# Patient Record
Sex: FEMALE | Race: WHITE | Employment: FULL TIME | URBAN - METROPOLITAN AREA
[De-identification: names, ages, dates, MRNs, and addresses within clinical notes are randomized per-mention and may not be internally consistent; named-entity substitution may affect disease eponyms.]

---

## 2024-08-12 ENCOUNTER — OFFICE VISIT (OUTPATIENT)
Dept: UROLOGY | Age: 69
End: 2024-08-12
Payer: COMMERCIAL

## 2024-08-12 ENCOUNTER — TELEPHONE (OUTPATIENT)
Dept: UROLOGY | Age: 69
End: 2024-08-12

## 2024-08-12 DIAGNOSIS — N20.0 CALCULUS OF KIDNEY: Primary | ICD-10-CM

## 2024-08-12 DIAGNOSIS — N20.0 KIDNEY STONE: ICD-10-CM

## 2024-08-12 LAB
BILIRUBIN, URINE, POC: NEGATIVE
BLOOD URINE, POC: ABNORMAL
GLUCOSE URINE, POC: NEGATIVE
KETONES, URINE, POC: NEGATIVE
LEUKOCYTE ESTERASE, URINE, POC: ABNORMAL
NITRITE, URINE, POC: NEGATIVE
PH, URINE, POC: 5.5 (ref 4.6–8)
PROTEIN,URINE, POC: NEGATIVE
SPECIFIC GRAVITY, URINE, POC: 1.02 (ref 1–1.03)
URINALYSIS CLARITY, POC: CLEAR
URINALYSIS COLOR, POC: YELLOW
UROBILINOGEN, POC: ABNORMAL

## 2024-08-12 PROCEDURE — 1123F ACP DISCUSS/DSCN MKR DOCD: CPT | Performed by: NURSE PRACTITIONER

## 2024-08-12 PROCEDURE — 81003 URINALYSIS AUTO W/O SCOPE: CPT | Performed by: NURSE PRACTITIONER

## 2024-08-12 PROCEDURE — 99204 OFFICE O/P NEW MOD 45 MIN: CPT | Performed by: NURSE PRACTITIONER

## 2024-08-12 RX ORDER — TRAMADOL HYDROCHLORIDE 50 MG/1
50 TABLET ORAL EVERY 6 HOURS PRN
COMMUNITY

## 2024-08-12 ASSESSMENT — ENCOUNTER SYMPTOMS
BACK PAIN: 0
HEARTBURN: 0
DIARRHEA: 0
ABDOMINAL PAIN: 0
EYE PAIN: 0
NAUSEA: 1
WHEEZING: 0
VOMITING: 0
SKIN LESIONS: 0
SHORTNESS OF BREATH: 0
CONSTIPATION: 0
EYE DISCHARGE: 0
COUGH: 0
INDIGESTION: 0
BLOOD IN STOOL: 0

## 2024-08-12 NOTE — TELEPHONE ENCOUNTER
Patient needs to be scheduled for cystoscopy, left retrograde, left ureteroscopy holmium laser and possible left stent placement    Orders have been done    Thank you  Ally

## 2024-08-12 NOTE — PROGRESS NOTES
[facility]  [address] [place] [address]  [phone]          Shruthi Ahuja  : 1955    Chief Complaint   Patient presents with    New Patient    Nephrolithiasis          HPI     Shruthi Ahuja is a 69 y.o. female  Patient here today for follow-up after being seen at emergency MD secondary to abrupt onset left flank pain.  The pain started last Monday midday.  She had had stones before and knew that this may be a stone trying to pass.  She has said she is had only 1 other stone as she was 20 years old at that time.  The stone measured 15 mm.    Since last Monday she has had intermittent pain on and off.  Today the pain has increased somewhat.  It is still located high on the left flank area.  It rarely radiates.  She has had some mild nausea but no vomiting.  She has not experienced any fever chills or gross hematuria.    CT was obtained at emergency MD.  I have the report only and that can be seen under the media section of the chart.  Impression of CT revealed bilateral nephrolithiasis with a 7 mm left ureteral pelvic junction stone with moderate hydronephrosis.  She is controlling her pain with tramadol.  She does not need refills of that today.    We will obtain a KUB today.    Past Medical History:   Diagnosis Date    History of kidney stones      Past Surgical History:   Procedure Laterality Date    CARPAL TUNNEL RELEASE       SECTION  1983    Pt reports 2 more in 1985 and 1987    CHOLECYSTECTOMY  2021    LITHOTRIPSY  2021     Current Outpatient Medications   Medication Sig Dispense Refill    traMADol (ULTRAM) 50 MG tablet Take 1 tablet by mouth every 6 hours as needed for Pain. Max Daily Amount: 200 mg       No current facility-administered medications for this visit.     Allergies   Allergen Reactions    Codone [Hydrocodone]      Social History     Socioeconomic History    Marital status:      Spouse name: Not on file    Number of children:

## 2024-08-15 PROBLEM — N20.0 KIDNEY STONE: Status: ACTIVE | Noted: 2024-08-12

## 2024-08-15 RX ORDER — AMOXICILLIN 500 MG
1 CAPSULE ORAL DAILY
COMMUNITY

## 2024-08-15 RX ORDER — AMPICILLIN TRIHYDRATE 250 MG
2 CAPSULE ORAL DAILY
COMMUNITY

## 2024-08-15 NOTE — TELEPHONE ENCOUNTER
Procedures: Procedure(s):   CYSTOSCOPY, LEFT RETROGRADE, LEFT URETEROSCOPY, HOLMIUM LASER, POSSIBLE LEFT STENT PLACEMENT   Date: 8/20/2024   Time: 1310   Location: [facility] MAIN OR 01 [facility]STO     Scheduled.  Post op appt scheduled.

## 2024-08-19 ENCOUNTER — ANESTHESIA EVENT (OUTPATIENT)
Dept: SURGERY | Age: 69
End: 2024-08-19
Payer: COMMERCIAL

## 2024-08-20 ENCOUNTER — HOSPITAL ENCOUNTER (OUTPATIENT)
Age: 69
Setting detail: OUTPATIENT SURGERY
Discharge: HOME | End: 2024-08-20
Attending: UROLOGY | Admitting: UROLOGY
Payer: COMMERCIAL

## 2024-08-20 ENCOUNTER — ANESTHESIA (OUTPATIENT)
Dept: SURGERY | Age: 69
End: 2024-08-20
Payer: COMMERCIAL

## 2024-08-20 VITALS
HEART RATE: 70 BPM | RESPIRATION RATE: 17 BRPM | SYSTOLIC BLOOD PRESSURE: 148 MMHG | HEIGHT: 62 IN | OXYGEN SATURATION: 97 % | WEIGHT: 220.6 LBS | BODY MASS INDEX: 40.59 KG/M2 | TEMPERATURE: 98 F | DIASTOLIC BLOOD PRESSURE: 72 MMHG

## 2024-08-20 DIAGNOSIS — N20.0 KIDNEY STONE: Primary | ICD-10-CM

## 2024-08-20 LAB
ANION GAP SERPL CALC-SCNC: 11 MMOL/L (ref 9–18)
BUN SERPL-MCNC: 13 MG/DL (ref 8–23)
CALCIUM SERPL-MCNC: 9.2 MG/DL (ref 8.8–10.2)
CHLORIDE SERPL-SCNC: 108 MMOL/L (ref 98–107)
CO2 SERPL-SCNC: 22 MMOL/L (ref 20–28)
CREAT SERPL-MCNC: 0.78 MG/DL (ref 0.6–1.1)
ERYTHROCYTE [DISTWIDTH] IN BLOOD BY AUTOMATED COUNT: 11.9 % (ref 11.9–14.6)
GLUCOSE SERPL-MCNC: 116 MG/DL (ref 70–99)
HCT VFR BLD AUTO: 43.5 % (ref 35.8–46.3)
HGB BLD-MCNC: 14.8 G/DL (ref 11.7–15.4)
MCH RBC QN AUTO: 33.4 PG (ref 26.1–32.9)
MCHC RBC AUTO-ENTMCNC: 34 G/DL (ref 31.4–35)
MCV RBC AUTO: 98.2 FL (ref 82–102)
NRBC # BLD: 0 K/UL (ref 0–0.2)
PLATELET # BLD AUTO: 306 K/UL (ref 150–450)
PMV BLD AUTO: 9.3 FL (ref 9.4–12.3)
POTASSIUM SERPL-SCNC: 4.4 MMOL/L (ref 3.5–5.1)
RBC # BLD AUTO: 4.43 M/UL (ref 4.05–5.2)
SODIUM SERPL-SCNC: 141 MMOL/L (ref 136–145)
WBC # BLD AUTO: 7.4 K/UL (ref 4.3–11.1)

## 2024-08-20 PROCEDURE — 6360000002 HC RX W HCPCS: Performed by: NURSE ANESTHETIST, CERTIFIED REGISTERED

## 2024-08-20 PROCEDURE — C1894 INTRO/SHEATH, NON-LASER: HCPCS | Performed by: UROLOGY

## 2024-08-20 PROCEDURE — 6370000000 HC RX 637 (ALT 250 FOR IP): Performed by: ANESTHESIOLOGY

## 2024-08-20 PROCEDURE — C1769 GUIDE WIRE: HCPCS | Performed by: UROLOGY

## 2024-08-20 PROCEDURE — 82355 CALCULUS ANALYSIS QUAL: CPT

## 2024-08-20 PROCEDURE — 2720000010 HC SURG SUPPLY STERILE: Performed by: UROLOGY

## 2024-08-20 PROCEDURE — 3700000000 HC ANESTHESIA ATTENDED CARE: Performed by: UROLOGY

## 2024-08-20 PROCEDURE — 3600000004 HC SURGERY LEVEL 4 BASE: Performed by: UROLOGY

## 2024-08-20 PROCEDURE — 7100000001 HC PACU RECOVERY - ADDTL 15 MIN: Performed by: UROLOGY

## 2024-08-20 PROCEDURE — 85027 COMPLETE CBC AUTOMATED: CPT

## 2024-08-20 PROCEDURE — 88300 SURGICAL PATH GROSS: CPT

## 2024-08-20 PROCEDURE — 6360000004 HC RX CONTRAST MEDICATION: Performed by: UROLOGY

## 2024-08-20 PROCEDURE — C1747 HC ENDOSCOPE, SINGLE, URINARY TRACT: HCPCS | Performed by: UROLOGY

## 2024-08-20 PROCEDURE — 80048 BASIC METABOLIC PNL TOTAL CA: CPT

## 2024-08-20 PROCEDURE — 7100000000 HC PACU RECOVERY - FIRST 15 MIN: Performed by: UROLOGY

## 2024-08-20 PROCEDURE — 2709999900 HC NON-CHARGEABLE SUPPLY: Performed by: UROLOGY

## 2024-08-20 PROCEDURE — 2500000003 HC RX 250 WO HCPCS: Performed by: NURSE ANESTHETIST, CERTIFIED REGISTERED

## 2024-08-20 PROCEDURE — 3600000014 HC SURGERY LEVEL 4 ADDTL 15MIN: Performed by: UROLOGY

## 2024-08-20 PROCEDURE — 6360000002 HC RX W HCPCS: Performed by: NURSE PRACTITIONER

## 2024-08-20 PROCEDURE — C2617 STENT, NON-COR, TEM W/O DEL: HCPCS | Performed by: UROLOGY

## 2024-08-20 PROCEDURE — 7100000010 HC PHASE II RECOVERY - FIRST 15 MIN: Performed by: UROLOGY

## 2024-08-20 PROCEDURE — 3700000001 HC ADD 15 MINUTES (ANESTHESIA): Performed by: UROLOGY

## 2024-08-20 PROCEDURE — 74420 UROGRAPHY RTRGR +-KUB: CPT | Performed by: UROLOGY

## 2024-08-20 PROCEDURE — 52356 CYSTO/URETERO W/LITHOTRIPSY: CPT | Performed by: UROLOGY

## 2024-08-20 PROCEDURE — 7100000011 HC PHASE II RECOVERY - ADDTL 15 MIN: Performed by: UROLOGY

## 2024-08-20 PROCEDURE — 2580000003 HC RX 258: Performed by: ANESTHESIOLOGY

## 2024-08-20 DEVICE — URETERAL STENT
Type: IMPLANTABLE DEVICE | Site: URETER | Status: FUNCTIONAL
Brand: PERCUFLEX™ PLUS

## 2024-08-20 RX ORDER — SODIUM CHLORIDE, SODIUM LACTATE, POTASSIUM CHLORIDE, CALCIUM CHLORIDE 600; 310; 30; 20 MG/100ML; MG/100ML; MG/100ML; MG/100ML
INJECTION, SOLUTION INTRAVENOUS CONTINUOUS
Status: DISCONTINUED | OUTPATIENT
Start: 2024-08-20 | End: 2024-08-20 | Stop reason: HOSPADM

## 2024-08-20 RX ORDER — OXYCODONE HYDROCHLORIDE AND ACETAMINOPHEN 5; 325 MG/1; MG/1
1 TABLET ORAL EVERY 6 HOURS PRN
Qty: 20 TABLET | Refills: 0 | Status: SHIPPED | OUTPATIENT
Start: 2024-08-20 | End: 2024-08-25

## 2024-08-20 RX ORDER — SODIUM CHLORIDE 9 MG/ML
INJECTION, SOLUTION INTRAVENOUS PRN
Status: DISCONTINUED | OUTPATIENT
Start: 2024-08-20 | End: 2024-08-20 | Stop reason: HOSPADM

## 2024-08-20 RX ORDER — ROCURONIUM BROMIDE 10 MG/ML
INJECTION, SOLUTION INTRAVENOUS PRN
Status: DISCONTINUED | OUTPATIENT
Start: 2024-08-20 | End: 2024-08-20 | Stop reason: SDUPTHER

## 2024-08-20 RX ORDER — ONDANSETRON 2 MG/ML
INJECTION INTRAMUSCULAR; INTRAVENOUS PRN
Status: DISCONTINUED | OUTPATIENT
Start: 2024-08-20 | End: 2024-08-20 | Stop reason: SDUPTHER

## 2024-08-20 RX ORDER — FENTANYL CITRATE 50 UG/ML
INJECTION, SOLUTION INTRAMUSCULAR; INTRAVENOUS PRN
Status: DISCONTINUED | OUTPATIENT
Start: 2024-08-20 | End: 2024-08-20 | Stop reason: SDUPTHER

## 2024-08-20 RX ORDER — NEOSTIGMINE METHYLSULFATE 1 MG/ML
INJECTION INTRAVENOUS PRN
Status: DISCONTINUED | OUTPATIENT
Start: 2024-08-20 | End: 2024-08-20 | Stop reason: SDUPTHER

## 2024-08-20 RX ORDER — SODIUM CHLORIDE 0.9 % (FLUSH) 0.9 %
5-40 SYRINGE (ML) INJECTION EVERY 12 HOURS SCHEDULED
Status: DISCONTINUED | OUTPATIENT
Start: 2024-08-20 | End: 2024-08-20 | Stop reason: HOSPADM

## 2024-08-20 RX ORDER — NALOXONE HYDROCHLORIDE 0.4 MG/ML
INJECTION, SOLUTION INTRAMUSCULAR; INTRAVENOUS; SUBCUTANEOUS PRN
Status: DISCONTINUED | OUTPATIENT
Start: 2024-08-20 | End: 2024-08-20 | Stop reason: HOSPADM

## 2024-08-20 RX ORDER — SODIUM CHLORIDE 0.9 % (FLUSH) 0.9 %
5-40 SYRINGE (ML) INJECTION PRN
Status: DISCONTINUED | OUTPATIENT
Start: 2024-08-20 | End: 2024-08-20 | Stop reason: HOSPADM

## 2024-08-20 RX ORDER — LIDOCAINE HYDROCHLORIDE 10 MG/ML
1 INJECTION, SOLUTION INFILTRATION; PERINEURAL
Status: DISCONTINUED | OUTPATIENT
Start: 2024-08-20 | End: 2024-08-20 | Stop reason: HOSPADM

## 2024-08-20 RX ORDER — PROCHLORPERAZINE EDISYLATE 5 MG/ML
5 INJECTION INTRAMUSCULAR; INTRAVENOUS
Status: DISCONTINUED | OUTPATIENT
Start: 2024-08-20 | End: 2024-08-20 | Stop reason: HOSPADM

## 2024-08-20 RX ORDER — OXYCODONE HYDROCHLORIDE 5 MG/1
5 TABLET ORAL PRN
Status: DISCONTINUED | OUTPATIENT
Start: 2024-08-20 | End: 2024-08-20 | Stop reason: HOSPADM

## 2024-08-20 RX ORDER — ACETAMINOPHEN 500 MG
1000 TABLET ORAL ONCE
Status: COMPLETED | OUTPATIENT
Start: 2024-08-20 | End: 2024-08-20

## 2024-08-20 RX ORDER — LABETALOL HYDROCHLORIDE 5 MG/ML
INJECTION, SOLUTION INTRAVENOUS PRN
Status: DISCONTINUED | OUTPATIENT
Start: 2024-08-20 | End: 2024-08-20 | Stop reason: SDUPTHER

## 2024-08-20 RX ORDER — ESMOLOL HYDROCHLORIDE 10 MG/ML
INJECTION, SOLUTION INTRAVENOUS PRN
Status: DISCONTINUED | OUTPATIENT
Start: 2024-08-20 | End: 2024-08-20 | Stop reason: SDUPTHER

## 2024-08-20 RX ORDER — ONDANSETRON 2 MG/ML
4 INJECTION INTRAMUSCULAR; INTRAVENOUS
Status: DISCONTINUED | OUTPATIENT
Start: 2024-08-20 | End: 2024-08-20 | Stop reason: HOSPADM

## 2024-08-20 RX ORDER — OXYCODONE HYDROCHLORIDE 5 MG/1
10 TABLET ORAL PRN
Status: DISCONTINUED | OUTPATIENT
Start: 2024-08-20 | End: 2024-08-20 | Stop reason: HOSPADM

## 2024-08-20 RX ORDER — HYDRALAZINE HYDROCHLORIDE 20 MG/ML
5 INJECTION INTRAMUSCULAR; INTRAVENOUS ONCE
Status: DISCONTINUED | OUTPATIENT
Start: 2024-08-20 | End: 2024-08-20 | Stop reason: HOSPADM

## 2024-08-20 RX ORDER — DIPHENHYDRAMINE HYDROCHLORIDE 50 MG/ML
12.5 INJECTION INTRAMUSCULAR; INTRAVENOUS
Status: DISCONTINUED | OUTPATIENT
Start: 2024-08-20 | End: 2024-08-20 | Stop reason: HOSPADM

## 2024-08-20 RX ORDER — DEXAMETHASONE SODIUM PHOSPHATE 4 MG/ML
INJECTION, SOLUTION INTRA-ARTICULAR; INTRALESIONAL; INTRAMUSCULAR; INTRAVENOUS; SOFT TISSUE PRN
Status: DISCONTINUED | OUTPATIENT
Start: 2024-08-20 | End: 2024-08-20 | Stop reason: SDUPTHER

## 2024-08-20 RX ORDER — CEPHALEXIN 500 MG/1
500 CAPSULE ORAL 2 TIMES DAILY
Qty: 6 CAPSULE | Refills: 0 | Status: SHIPPED | OUTPATIENT
Start: 2024-08-20 | End: 2024-08-23

## 2024-08-20 RX ORDER — PROPOFOL 10 MG/ML
INJECTION, EMULSION INTRAVENOUS PRN
Status: DISCONTINUED | OUTPATIENT
Start: 2024-08-20 | End: 2024-08-20 | Stop reason: SDUPTHER

## 2024-08-20 RX ORDER — MIDAZOLAM HYDROCHLORIDE 2 MG/2ML
2 INJECTION, SOLUTION INTRAMUSCULAR; INTRAVENOUS
Status: DISCONTINUED | OUTPATIENT
Start: 2024-08-20 | End: 2024-08-20 | Stop reason: HOSPADM

## 2024-08-20 RX ORDER — GLYCOPYRROLATE 0.2 MG/ML
INJECTION INTRAMUSCULAR; INTRAVENOUS PRN
Status: DISCONTINUED | OUTPATIENT
Start: 2024-08-20 | End: 2024-08-20 | Stop reason: SDUPTHER

## 2024-08-20 RX ADMIN — ONDANSETRON 4 MG: 2 INJECTION INTRAMUSCULAR; INTRAVENOUS at 12:57

## 2024-08-20 RX ADMIN — ESMOLOL HYDROCHLORIDE 30 MG: 10 INJECTION INTRAVENOUS at 12:52

## 2024-08-20 RX ADMIN — PROPOFOL 200 MG: 10 INJECTION, EMULSION INTRAVENOUS at 12:45

## 2024-08-20 RX ADMIN — Medication 2 G: at 12:55

## 2024-08-20 RX ADMIN — DEXAMETHASONE SODIUM PHOSPHATE 4 MG: 4 INJECTION INTRA-ARTICULAR; INTRALESIONAL; INTRAMUSCULAR; INTRAVENOUS; SOFT TISSUE at 12:57

## 2024-08-20 RX ADMIN — LABETALOL HYDROCHLORIDE 10 MG: 5 INJECTION INTRAVENOUS at 13:33

## 2024-08-20 RX ADMIN — FENTANYL CITRATE 50 MCG: 50 INJECTION, SOLUTION INTRAMUSCULAR; INTRAVENOUS at 12:45

## 2024-08-20 RX ADMIN — NEOSTIGMINE METHYLSULFATE 5 MG: 1 INJECTION, SOLUTION INTRAVENOUS at 13:27

## 2024-08-20 RX ADMIN — ACETAMINOPHEN 1000 MG: 500 TABLET, FILM COATED ORAL at 11:29

## 2024-08-20 RX ADMIN — FENTANYL CITRATE 50 MCG: 50 INJECTION, SOLUTION INTRAMUSCULAR; INTRAVENOUS at 12:52

## 2024-08-20 RX ADMIN — GLYCOPYRROLATE 0.4 MG: 0.2 INJECTION INTRAMUSCULAR; INTRAVENOUS at 13:27

## 2024-08-20 RX ADMIN — ROCURONIUM BROMIDE 35 MG: 10 INJECTION, SOLUTION INTRAVENOUS at 12:45

## 2024-08-20 RX ADMIN — SODIUM CHLORIDE, POTASSIUM CHLORIDE, SODIUM LACTATE AND CALCIUM CHLORIDE: 600; 310; 30; 20 INJECTION, SOLUTION INTRAVENOUS at 11:29

## 2024-08-20 ASSESSMENT — PAIN SCALES - GENERAL
PAINLEVEL_OUTOF10: 0

## 2024-08-20 ASSESSMENT — PAIN - FUNCTIONAL ASSESSMENT: PAIN_FUNCTIONAL_ASSESSMENT: 0-10

## 2024-08-20 ASSESSMENT — LIFESTYLE VARIABLES: SMOKING_STATUS: 0

## 2024-08-20 NOTE — OP NOTE
usual sterile fashion.  I began the case by inserting a 22-Wallisian rigid cystoscope with a 30-degree lens in the urethra, advanced in the patient's bladder.  Pan cystoscopy was performed, which was unremarkable.  The left ureteral orifice was cannulated with a Sensor wire, which was advanced under fluoroscopic guidance in the left renal pelvis.  Once this was in position, I back-loaded the scope off the wire, switched to a pressure bag and my rigid ureteroscope.  I inserted this under direct vision using a 2nd Sensor wire as a guide into the left ureter.  Once in the left ureter, I injected 10 mL of contrast through the scope to perform the left retrograde pyelogram, which showed a filling defect in the left proximal UPJ consistent with a stone.  There was hydronephrosis behind the level of the stone.  I then advanced my scope further up the ureter to where the stone and obstruction was.  The rigid ureteroscope was able to reach it.  I then got a two 42 holmium laser fiber and I used the settings of 0.8 joules and 8 hertz to laser the stone into small pieces.  Some of the pieces were remained in the ureter, but some floated back into the left renal pelvis.  After the lasering was complete, I used the DrFirst basket to grab the pieces in the ureter and bring them down to the bladder and deposit them there.  I then inspected the entire course of the ureter from the UPJ to the UVJ with the rigid scope and no concerning ureter stones and no evidence of ureteral injury was found.  I then replaced the Sensor wire through the rigid scope alongside the 1st into the left renal pelvis.  I removed the rigid ureteroscope and then switched to my 11 x 13 ureteral access sheath.  I passed this under fluoroscopic guidance into the proximal left ureter.  Once in position, I removed the inner sheath, leaving the outer sheath in place with the safety wire.  I then switched to my flexible ureteroscope and inserted this through the access

## 2024-08-20 NOTE — BRIEF OP NOTE
Brief Postoperative Note      Patient: Shruthi Ahuja  YOB: 1955  MRN: [id number]    Date of Procedure: 8/20/2024    Pre-Op Diagnosis Codes:      * Kidney stone [N20.0]    Post-Op Diagnosis: Same       Procedure(s):  CYSTOSCOPY, LEFT RETROGRADE, LEFT URETEROSCOPY, HOLMIUM LASER, LEFT STENT PLACEMENT, Basket Stone Extraction    Surgeon(s):  Flako Mir MD    Assistant:  * No surgical staff found *    Anesthesia: General    Estimated Blood Loss (mL): Minimal    Complications: None    Specimens:   ID Type Source Tests Collected by Time Destination   A : Left Kidney Stones Tissue Kidney SURGICAL PATHOLOGY Flako Mir MD 8/20/2024 1319        Implants:  Implant Name Type Inv. Item Serial No.  Lot No. LRB No. Used Action   STENT URET 6FR L24CM HYDR+ GRAD CIRCUMFERENTIAL MRK LO PROF - [id number]  STENT URET 6FR L24CM HYDR+ GRAD CIRCUMFERENTIAL MRK LO PROF  [employer] [facility]-WD [id number] Left 1 Implanted         Drains: * No LDAs found *    Findings:  Infection Present At Time Of Surgery (PATOS) (choose all levels that have infection present):  No infection present  Other Findings: 9 mm L UPJ stone fragmented / removed.      Electronically signed by Flako Mir MD on 8/20/2024 at 2:00 PM

## 2024-08-20 NOTE — PERIOP NOTE
Called DR. Mir's office to confirm location of follow up visit on 8/27 as 2 various locations were listed. Location confirmed for [place]. Pt/family updated and understanding verbalized.  
Dr. Warren called and updatedon pt's BP trend  and that hydralazine was not given. verbal sign-out obtained at this time.   
MD Childers called and updated on pt bp 187/86. HR 79. Orders received. Will medicate and continue to monitor.   
permitted.

## 2024-08-20 NOTE — ANESTHESIA PROCEDURE NOTES
Airway  Date/Time: 8/20/2024 12:48 PM  Urgency: elective    Airway not difficult    General Information and Staff    Patient location during procedure: OR  Anesthesiologist: Armando Childers MD  Resident/CRNA: Joanne Barrientos APRN - CRNA  Performed: resident/CRNA/CAA   Performed by: Joanne Barrientos APRN - CRNA  Authorized by: Armando Childers MD      Indications and Patient Condition  Indications for airway management: anesthesia  Spontaneous ventilation: present  Sedation level: deep  Preoxygenated: yes  MILS not maintained throughout  Mask difficulty assessment: vent by bag mask    Final Airway Details  Final airway type: endotracheal airway      Successful airway: ETT  Cuffed: yes   Successful intubation technique: direct laryngoscopy  Facilitating devices/methods: intubating stylet  Endotracheal tube insertion site: oral  Blade: Iban  Blade size: #4  ETT size (mm): 7.0  Cormack-Lehane Classification: grade I - full view of glottis  Placement verified by: chest auscultation and capnometry   Inital cuff pressure (cm H2O): 8  Measured from: lips  ETT to teeth (cm): 22  ETT to lips (cm): 22  Number of attempts at approach: 1  Ventilation between attempts: bag mask  Number of other approaches attempted: 0    no

## 2024-08-20 NOTE — ANESTHESIA PRE PROCEDURE
Reactions    Codone [Hydrocodone]        Problem List:    Patient Active Problem List   Diagnosis Code    Kidney stone N20.0       Past Medical History:        Diagnosis Date    COVID-2020    History of kidney stones        Past Surgical History:        Procedure Laterality Date    CARPAL TUNNEL RELEASE       SECTION  1983    Pt reports 2 more in 1985 and 1987    CHOLECYSTECTOMY  2021    COLONOSCOPY  2024    multiple procedures in past    LITHOTRIPSY  2021       Social History:    Social History     Tobacco Use    Smoking status: Never     Passive exposure: Never    Smokeless tobacco: Never   Substance Use Topics    Alcohol use: Never                                Counseling given: Not Answered      Vital Signs (Current):   Vitals:    08/15/24 1323 24 1104 24 1106   BP:  (!) 168    Pulse:  80 80   Resp:  16    Temp:  98.2 °F (36.8 °C)    TempSrc:  Oral    SpO2:  98%    Weight: 99.8 kg (220 lb) 100.1 kg (220 lb 9.6 oz)    Height: 1.575 m (5' 2\") 1.575 m (5' 2\")                                               BP Readings from Last 3 Encounters:   24 (!) 168/77       NPO Status: Time of last liquid consumption:                         Time of last solid consumption:                         Date of last liquid consumption: 24                        Date of last solid food consumption: 24    BMI:   Wt Readings from Last 3 Encounters:   24 100.1 kg (220 lb 9.6 oz)     Body mass index is 40.35 kg/m².    CBC:   Lab Results   Component Value Date/Time    WBC 7.4 2024 11:10 AM    RBC 4.43 2024 11:10 AM    HGB 14.8 2024 11:10 AM    HCT 43.5 2024 11:10 AM    MCV 98.2 2024 11:10 AM    RDW 11.9 2024 11:10 AM     2024 11:10 AM       CMP: No results found for: \"NA\", \"K\", \"CL\", \"CO2\", \"BUN\", \"CREATININE\", \"GFRAA\", \"AGRATIO\", \"LABGLOM\", \"GLUCOSE\", \"GLU\", \"CALCIUM\", \"BILITOT\", \"ALKPHOS\", \"AST\", \"ALT\"    POC

## 2024-08-20 NOTE — DISCHARGE INSTRUCTIONS
maintaining a healthy lifestyle    You may be retaining fluid if you have a history of heart failure or if you experience any of the following symptoms:  Weight gain of 3 pounds or more overnight or 5 pounds in a week, increased swelling in our hands or feet or shortness of breath while lying flat in bed.  Please call your doctor as soon as you notice any of these symptoms; do not wait until your next office visit.

## 2024-08-20 NOTE — H&P
AUTO W/O MICRO   Result Value Ref Range     Color (UA POC) Yellow       Clarity (UA POC) Clear       Glucose, Urine, POC Negative       Bilirubin, Urine, POC Negative       KETONES, Urine, POC Negative       Specific Gravity, Urine, POC 1.025 (A) 1.001 - 1.035     Blood (UA POC) Trace-lysed       pH, Urine, POC 5.5 4.6 - 8.0     Protein, Urine, POC Negative       Urobilinogen, POC 0.2 mg/dL       Nitrite, Urine, POC Negative       Leukocyte Esterase, Urine, POC Small        PHYSICAL EXAM        GENERAL: No acute distress, Awake, Alert, Oriented X 3, Gait normal  CHEST AND LUNG: Easy work of breathing, clear to auscultation bilaterally, no cyanosis  CARDIAC: regular rate and rhythm  ABDOMEN: soft, non tender, mild left CVA tenderness  SKIN: No rash, no erythema, no lacerations or abrasions, no ecchymosis  MUSCULOSKELETAL- normal gait and station.         Assessment and Plan      ICD-10-CM     1. Calculus of kidney  N20.0 AMB POC URINALYSIS DIP STICK AUTO W/O MICRO       XR ABDOMEN (KUB) (SINGLE AP VIEW)          Kidney stone-  KUB today no stone is seen.  CT had revealed a 7 mm left ureteropelvic junction stone.  Patient would like to have this removed if at all possible.  Ureteroscopy will need to be arranged.     Treatment options with risks of benefits were discussed with the patient in detail.  Treatment options discussed include watchful waiting in hopes that the stone will pass, ESWL, ureteroscopy with laser lithotripsy. Plan is for cystoscopy, left ureteroscopy, laser lithotripsy, and left stent placement. Risk include but are not limited to bleeding, infection, ureteral injury requiring repair, risk or anesthesia. The  Patient understands that if we are unable to remove the stone, we will place a stent and the stone will be treated at a later date. The patient understands that the stent is temporary and must be removed in the office              Orders Placed This Encounter    XR ABDOMEN (KUB) (SINGLE AP

## 2024-08-21 NOTE — ANESTHESIA POSTPROCEDURE EVALUATION
Department of Anesthesiology  Postprocedure Note    Patient: Shruthi Ahuja  MRN: [id number]  YOB: 1955  Date of evaluation: 8/20/2024    Procedure Summary       Date: 08/20/24 Room / Location: [facility] MAIN OR  [facility] MAIN OR    Anesthesia Start: 1233 Anesthesia Stop: 1341    Procedure: CYSTOSCOPY, LEFT RETROGRADE, LEFT URETEROSCOPY, HOLMIUM LASER, LEFT STENT PLACEMENT (Left: Ureter) Diagnosis:       Kidney stone      (Kidney stone [N20.0])    Providers: Flako Mir MD Responsible Provider: Armando Childers MD    Anesthesia Type: General ASA Status: 2            Anesthesia Type: General    Xavier Phase I: Xavier Score: 9    Xavier Phase II: Xavier Score: 10    Anesthesia Post Evaluation    Patient location during evaluation: PACU  Patient participation: complete - patient participated  Level of consciousness: awake and alert  Airway patency: patent  Nausea & Vomiting: no nausea and no vomiting  Cardiovascular status: hemodynamically stable  Respiratory status: acceptable, nonlabored ventilation and spontaneous ventilation  Hydration status: euvolemic  Comments: BP (!) 148/72   Pulse 70   Temp 98 °F (36.7 °C) (Temporal)   Resp 17   Ht 1.575 m (5' 2\")   Wt 100.1 kg (220 lb 9.6 oz)   SpO2 97%   BMI 40.35 kg/m²     Multimodal analgesia pain management approach  Pain management: adequate and satisfactory to patient        No notable events documented.

## 2024-08-21 NOTE — ANESTHESIA POSTPROCEDURE EVALUATION
Department of Anesthesiology  Postprocedure Note    Patient: Shruthi Ahuja  MRN: [id number]  YOB: 1955  Date of evaluation: 8/20/2024    Procedure Summary       Date: 08/20/24 Room / Location: [facility] MAIN OR  [facility] MAIN OR    Anesthesia Start: 1233 Anesthesia Stop: 1341    Procedure: CYSTOSCOPY, LEFT RETROGRADE, LEFT URETEROSCOPY, HOLMIUM LASER, LEFT STENT PLACEMENT (Left: Ureter) Diagnosis:       Kidney stone      (Kidney stone [N20.0])    Providers: Flako Mir MD Responsible Provider: Armando Childers MD    Anesthesia Type: General ASA Status: 2            Anesthesia Type: General    Xavier Phase I: Xavier Score: 9    Xavier Phase II: Xavier Score: 10    Anesthesia Post Evaluation    Patient location during evaluation: PACU  Patient participation: complete - patient participated  Level of consciousness: awake and alert  Airway patency: patent  Nausea & Vomiting: no nausea and no vomiting  Cardiovascular status: hemodynamically stable  Respiratory status: acceptable, nonlabored ventilation and spontaneous ventilation  Hydration status: euvolemic  Comments: BP (!) 148/72   Pulse 70   Temp 98 °F (36.7 °C) (Temporal)   Resp 17   Ht 1.575 m (5' 2\")   Wt 100.1 kg (220 lb 9.6 oz)   SpO2 97%   BMI 40.35 kg/m²     Multimodal analgesia pain management approach  Pain management: adequate and satisfactory to patient        No notable events documented.

## 2024-08-27 ENCOUNTER — TELEPHONE (OUTPATIENT)
Dept: UROLOGY | Age: 69
End: 2024-08-27

## 2024-08-27 NOTE — PROGRESS NOTES
[facility]  [address]   [address]  [place] [address]  [phone]    Shruthi Ahuja  : 1955    CC: Kidney stones         HPI     Shruthi Ahuja is a 69 y.o. female who presents to the office today for post-op stent removal.     PMHx includes bilateral nephrolithiasis s/p left ureteroscopy, holmium laser lithotripsy, and left stent placement on 24.    Today, reports expected stent discomfort.  Stone analysis showed calcium oxalate stones.        Past Medical History:   Diagnosis Date    COVID-19 2020    History of kidney stones      Past Surgical History:   Procedure Laterality Date    CARPAL TUNNEL RELEASE       SECTION  1983    Pt reports 2 more in 1985 and 1987    CHOLECYSTECTOMY  2021    COLONOSCOPY  2024    multiple procedures in past    CYSTOSCOPY Left 2024    CYSTOSCOPY, LEFT RETROGRADE, LEFT URETEROSCOPY, HOLMIUM LASER, LEFT STENT PLACEMENT performed by Flako Mir MD at [facility] MAIN OR    LITHOTRIPSY  2021     Current Outpatient Medications   Medication Sig Dispense Refill    hyoscyamine (LEVSIN/SL) 125 MCG sublingual tablet Place 1 tablet under the tongue every 4 hours as needed (bladder spasms) 30 tablet 1    Multiple Vitamin (MULTIVITAMIN ADULT PO) Take 1 tablet by mouth Daily      Calcium Carb-Cholecalciferol (CALCIUM 600 + D PO) Take 1 tablet by mouth daily      Red Yeast Rice 600 MG CAPS Take 2 capsules by mouth Daily      Omega-3 Fatty Acids (FISH OIL) 1200 MG CAPS Take 1,200 mg by mouth daily      traMADol (ULTRAM) 50 MG tablet Take 1 tablet by mouth every 6 hours as needed for Pain.       No current facility-administered medications for this visit.     Allergies   Allergen Reactions    Codone [Hydrocodone]      Social History     Socioeconomic History    Marital status:      Spouse name: Not on file    Number of children: Not on file    Years of education: Not on file    Highest education level: Not on file    Occupational History    Not on file   Tobacco Use    Smoking status: Never     Passive exposure: Never    Smokeless tobacco: Never   Vaping Use    Vaping status: Never Used   Substance and Sexual Activity    Alcohol use: Never    Drug use: Never    Sexual activity: Defer     Partners: Male   Other Topics Concern    Not on file   Social History Narrative    Not on file     Social Determinants of Health     Financial Resource Strain: Not on file   Food Insecurity: Not on file   Transportation Needs: Not on file   Physical Activity: Not on file (3/19/2021)   Stress: Not on file   Social Connections: Unknown (3/19/2021)    Received from [employer]    Social Connections     Frequency of Communication with Friends and Family: Not asked     Frequency of Social Gatherings with Friends and Family: Not asked   Intimate Partner Violence: Unknown (3/19/2021)    Received from [employer]    Intimate Partner Violence     Fear of Current or Ex-Partner: Not asked     Emotionally Abused: Not asked     Physically Abused: Not asked     Sexually Abused: Not asked   Housing Stability: Not At Risk (3/9/2022)    Received from [employer]    Housing Stability     Was there a time when you did not have a steady place to sleep: Not asked     Worried that the place you are staying is making you sick: Not asked     Family History   Problem Relation Age of Onset    High Blood Pressure Mother        Review of Systems  Constitutional:   Negative for fever, chills, appetite change, malaise/fatigue, headaches and weight loss.  Skin:  Negative for skin lesions, rash and itching.  Eyes:  Negative for visual disturbance, eye pain and eye discharge.  ENT:  Negative for difficulty articulating words, pain swallowing, high frequency hearing loss and dry mouth.  Respiratory:  Negative for cough, blood in sputum and wheezing.  Cardiovascular:  Negative for chest pain, hypertension, irregular heartbeat, leg pain, leg swelling, regular rate and

## 2024-08-27 NOTE — TELEPHONE ENCOUNTER
Pt came in to get stent removed. She says she couldn't understand the VM. When can we reschedule her?

## 2024-08-29 ENCOUNTER — OFFICE VISIT (OUTPATIENT)
Dept: UROLOGY | Age: 69
End: 2024-08-29
Payer: COMMERCIAL

## 2024-08-29 DIAGNOSIS — N20.0 KIDNEY STONE: Primary | ICD-10-CM

## 2024-08-29 LAB
BILIRUBIN, URINE, POC: NORMAL
BLOOD URINE, POC: NORMAL
GLUCOSE URINE, POC: NEGATIVE
KETONES, URINE, POC: NEGATIVE
LEUKOCYTE ESTERASE, URINE, POC: NORMAL
NITRITE, URINE, POC: POSITIVE
PH, URINE, POC: 5.5 (ref 4.6–8)
PROTEIN,URINE, POC: 300
SPECIFIC GRAVITY, URINE, POC: 1.02 (ref 1–1.03)
URINALYSIS CLARITY, POC: NORMAL
URINALYSIS COLOR, POC: NORMAL
UROBILINOGEN, POC: NORMAL

## 2024-08-29 PROCEDURE — 1123F ACP DISCUSS/DSCN MKR DOCD: CPT | Performed by: UROLOGY

## 2024-08-29 PROCEDURE — 99214 OFFICE O/P EST MOD 30 MIN: CPT | Performed by: UROLOGY

## 2024-08-29 PROCEDURE — 81003 URINALYSIS AUTO W/O SCOPE: CPT | Performed by: UROLOGY

## 2024-08-30 ASSESSMENT — ENCOUNTER SYMPTOMS
WHEEZING: 0
BLOOD IN STOOL: 0
BACK PAIN: 0
CONSTIPATION: 0
EYE DISCHARGE: 0
SKIN LESIONS: 0
VOMITING: 0
COUGH: 0
ABDOMINAL PAIN: 0
NAUSEA: 0
DIARRHEA: 0
EYE PAIN: 0
HEARTBURN: 0
INDIGESTION: 0

## 2024-10-22 ENCOUNTER — HOSPITAL ENCOUNTER (OUTPATIENT)
Dept: ULTRASOUND IMAGING | Age: 69
Discharge: HOME | End: 2024-10-24
Payer: COMMERCIAL

## 2024-10-22 DIAGNOSIS — N20.0 KIDNEY STONE: ICD-10-CM

## 2024-10-22 PROCEDURE — 76770 US EXAM ABDO BACK WALL COMP: CPT

## 2024-10-28 ENCOUNTER — OFFICE VISIT (OUTPATIENT)
Dept: UROLOGY | Age: 69
End: 2024-10-28
Payer: COMMERCIAL

## 2024-10-28 DIAGNOSIS — N20.0 KIDNEY STONE: Primary | ICD-10-CM

## 2024-10-28 DIAGNOSIS — N20.0 CALCULUS OF KIDNEY: ICD-10-CM

## 2024-10-28 LAB
BILIRUBIN, URINE, POC: NEGATIVE
BLOOD URINE, POC: NORMAL
GLUCOSE URINE, POC: NEGATIVE MG/DL
KETONES, URINE, POC: NEGATIVE MG/DL
LEUKOCYTE ESTERASE, URINE, POC: NORMAL
NITRITE, URINE, POC: NEGATIVE
PH, URINE, POC: 6 (ref 4.6–8)
PROTEIN,URINE, POC: 30 MG/DL
SPECIFIC GRAVITY, URINE, POC: 1.03 (ref 1–1.03)
URINALYSIS CLARITY, POC: NORMAL
URINALYSIS COLOR, POC: NORMAL
UROBILINOGEN, POC: NORMAL MG/DL

## 2024-10-28 PROCEDURE — 99213 OFFICE O/P EST LOW 20 MIN: CPT | Performed by: UROLOGY

## 2024-10-28 PROCEDURE — 81003 URINALYSIS AUTO W/O SCOPE: CPT | Performed by: UROLOGY

## 2024-10-28 PROCEDURE — 1123F ACP DISCUSS/DSCN MKR DOCD: CPT | Performed by: UROLOGY

## 2024-11-01 NOTE — PROGRESS NOTES
urinary burning, hematuria, flank pain, recurrent UTIs, nocturia, slower stream, straining, urgency, leakage w/ urge, frequent urination, incomplete emptying and vaginal pain.  Musculoskeletal:  Negative for back pain, bone pain, arthralgias, tenderness, muscle weakness and neck pain.  Neurological:  Negative for dizziness, focal weakness, numbness, seizures and tremors.  Psychological:  Negative for depression and psychiatric problem.  Endocrine:  Negative for cold intolerance, thirst, excessive urination, fatigue and heat intolerance.  Hem/Lymphatic:  Negative for easy bleeding, easy bruising and frequent infections.      Urinalysis  UA - Dipstick  @LASTPROCAMB([id number])@    UA - Micro  WBC - 0  RBC - 0  Bacteria - 0  Epith - 0    There were no vitals taken for this visit.     GENERAL: No acute distress, Awake, Alert, Oriented X 3, Gait normal  CARDIAC: regular rate and rhythm  CHEST AND LUNG: Easy work of breathing, clear to auscultation bilaterally, no cyanosis  ABDOMEN: soft, non tender, non-distended, positive bowel sounds, no organomegaly, no palpable masses, no guarding, no rebound tenderness  SKIN: No rash, no erythema, no lacerations or abrasions, no ecchymosis  NEUROLOGIC: cranial nerves 2-12 grossly intact     Renal US: 10/2024    IMPRESSION:  Mild left hydronephrosis with multiple nonobstructing left lower  pole calculi. Small 8 mm right renal cyst.      Assessment and Plan    ICD-10-CM    1. Kidney stone  N20.0 AMB POC URINALYSIS DIP STICK AUTO W/O MICRO      2. Calculus of kidney  N20.0 AMB POC URINALYSIS DIP STICK AUTO W/O MICRO          Kidney Stones:   US reviewed.     I have educated pt. about diet modifications that can decrease the risk of future calcium stone formation.  These include decreasing things high in oxalate such as teas and isac.  Also, I have encouraged pt. to increase clear liquid intake, especially H2O, and decrease dietary sodium intake.  Things high in citrate such

## 2025-05-23 ENCOUNTER — TRANSCRIBE ORDERS (OUTPATIENT)
Dept: SCHEDULING | Age: 70
End: 2025-05-23

## 2025-05-23 DIAGNOSIS — Z12.31 ENCOUNTER FOR SCREENING MAMMOGRAM FOR MALIGNANT NEOPLASM OF BREAST: Primary | ICD-10-CM

## 2025-07-01 ENCOUNTER — HOSPITAL ENCOUNTER (OUTPATIENT)
Dept: MAMMOGRAPHY | Age: 70
Discharge: HOME | End: 2025-07-04
Payer: COMMERCIAL

## 2025-07-01 VITALS — WEIGHT: 211 LBS | BODY MASS INDEX: 38.83 KG/M2 | HEIGHT: 62 IN

## 2025-07-01 DIAGNOSIS — Z12.31 ENCOUNTER FOR SCREENING MAMMOGRAM FOR MALIGNANT NEOPLASM OF BREAST: ICD-10-CM

## 2025-07-01 PROCEDURE — 77067 SCR MAMMO BI INCL CAD: CPT

## (undated) DEVICE — URETERAL ACCESS SHEATH SET: Brand: NAVIGATOR HD

## (undated) DEVICE — PACK SURGICAL PROCEDURE KIT CYSTOSCOPY TOTE

## (undated) DEVICE — GARMENT,MEDLINE,DVT,INT,CALF,MED, GEN2: Brand: MEDLINE

## (undated) DEVICE — SINGLE-USE DIGITAL FLEXIBLE URETEROSCOPE: Brand: LITHOVUE

## (undated) DEVICE — NITINOL STONE RETRIEVAL DEVICE: Brand: DAKOTA

## (undated) DEVICE — Device

## (undated) DEVICE — FLEXIVA PULSE AND FLEXIVA PULSE TRACTIP LASER FIBERS ARE HIGH POWER SINGLE-USE: Brand: FLEXIVA PULSE

## (undated) DEVICE — SOLUTION IRRIG 3000ML H2O STRL BAG

## (undated) DEVICE — TURP TURB: Brand: MEDLINE INDUSTRIES, INC.

## (undated) DEVICE — GUIDEWIRE UROLOGICAL STR 3 CM 0.038 INX150 CM DUAL-FLEX